# Patient Record
Sex: FEMALE | Race: WHITE | NOT HISPANIC OR LATINO | Employment: STUDENT | URBAN - METROPOLITAN AREA
[De-identification: names, ages, dates, MRNs, and addresses within clinical notes are randomized per-mention and may not be internally consistent; named-entity substitution may affect disease eponyms.]

---
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
## 2025-04-02 ENCOUNTER — TELEPHONE (OUTPATIENT)
Dept: OBSTETRICS AND GYNECOLOGY | Facility: CLINIC | Age: 20
End: 2025-04-02
Payer: MEDICAID

## 2025-04-02 NOTE — TELEPHONE ENCOUNTER
----- Message from Karly sent at 4/2/2025  1:03 PM CDT -----  Regarding: appointment access  Contact: anand  Type:  Sooner Apoointment RequestCaller is requesting a sooner appointment.  Caller declined first available appointment listed below.  Caller will not accept being placed on the waitlist and is requesting a message be sent to doctor.Name of Caller: Gayle When is the first available appointment?Symptoms:Would the patient rather a call back or a response via [facility]? [facility]: [phone] (home)  Additional Information:  Anand is requesting a callback to be rescheduled.

## 2025-05-19 ENCOUNTER — OFFICE VISIT (OUTPATIENT)
Dept: OBSTETRICS AND GYNECOLOGY | Facility: CLINIC | Age: 20
End: 2025-05-19
Payer: MEDICAID

## 2025-05-19 VITALS — SYSTOLIC BLOOD PRESSURE: 112 MMHG | WEIGHT: 134.81 LBS | DIASTOLIC BLOOD PRESSURE: 77 MMHG | HEART RATE: 102 BPM

## 2025-05-19 DIAGNOSIS — N93.9 ABNORMAL UTERINE BLEEDING (AUB): ICD-10-CM

## 2025-05-19 DIAGNOSIS — Z01.419 WOMEN'S ANNUAL ROUTINE GYNECOLOGICAL EXAMINATION: Primary | ICD-10-CM

## 2025-05-19 PROCEDURE — 3074F SYST BP LT 130 MM HG: CPT | Mod: CPTII,,, | Performed by: OBSTETRICS & GYNECOLOGY

## 2025-05-19 PROCEDURE — 3078F DIAST BP <80 MM HG: CPT | Mod: CPTII,,, | Performed by: OBSTETRICS & GYNECOLOGY

## 2025-05-19 PROCEDURE — 99395 PREV VISIT EST AGE 18-39: CPT | Mod: S$PBB,,, | Performed by: OBSTETRICS & GYNECOLOGY

## 2025-05-19 PROCEDURE — 1159F MED LIST DOCD IN RCRD: CPT | Mod: CPTII,,, | Performed by: OBSTETRICS & GYNECOLOGY

## 2025-05-19 RX ORDER — METOPROLOL SUCCINATE 25 MG/1
TABLET, EXTENDED RELEASE ORAL
COMMUNITY
Start: 2025-02-12

## 2025-05-19 NOTE — PROGRESS NOTES
Subjective:       Patient ID: Jocelynn Herron is a 20 y.o. female.    Chief Complaint:  Well Woman      History of Present Illness  Pt here for gyn annual.  History and past labs reviewed with patient.    Complaints none       Review of Systems  Review of Systems   Constitutional:  Negative for chills and fever.   Respiratory:  Negative for shortness of breath.    Cardiovascular:  Negative for chest pain.   Gastrointestinal:  Negative for abdominal pain, blood in stool, constipation, diarrhea, nausea, vomiting and reflux.   Genitourinary:  Negative for dysmenorrhea, dyspareunia, dysuria, hematuria, hot flashes, menorrhagia, menstrual problem, pelvic pain, vaginal bleeding, vaginal discharge, postcoital bleeding and vaginal dryness.   Musculoskeletal:  Negative for arthralgias and joint swelling.   Integumentary:  Negative for rash, hair changes, breast mass, nipple discharge and breast skin changes.   Psychiatric/Behavioral:  Negative for depression. The patient is not nervous/anxious.    Breast: Negative for asymmetry, lump, mass, nipple discharge and skin changes          Objective:    Physical Exam:   Constitutional: She appears well-developed and well-nourished. No distress.    HENT:   Head: Normocephalic.    Eyes: Conjunctivae and EOM are normal.    Neck: No tracheal deviation present. No thyromegaly present.    Cardiovascular:       Exam reveals no clubbing, no cyanosis and no edema.        Pulmonary/Chest: Effort normal. No respiratory distress.                  Musculoskeletal: Normal range of motion and moves all extremeties.        Skin: No rash noted. She is not diaphoretic. No cyanosis. Nails show no clubbing.    Psychiatric: She has a normal mood and affect. Her behavior is normal. Judgment and thought content normal.          Assessment:        1. Women's annual routine gynecological examination    2. Abnormal uterine bleeding (AUB)               Plan:      Annual exam   Discussed cycle  regulation - continue OCPs   gardasil UTD   RTC 1 year for annual exam

## 2025-05-20 DIAGNOSIS — N93.9 ABNORMAL UTERINE BLEEDING (AUB): ICD-10-CM

## 2025-05-20 RX ORDER — LEVONORGESTREL / ETHINYL ESTRADIOL AND ETHINYL ESTRADIOL 150-30(84)
1 KIT ORAL DAILY
Qty: 84 EACH | Refills: 3 | Status: SHIPPED | OUTPATIENT
Start: 2025-05-20 | End: 2026-05-20

## 2025-05-20 NOTE — TELEPHONE ENCOUNTER
----- Message from Karly sent at 5/20/2025  9:42 AM CDT -----  Regarding: refill  Contact: Gayle  Type:  RX Refill RequestWho Called: DebbieRefill or New Rx: refill RX Name and Strength: Simtesse How is the patient currently taking it? (ex. 1XDay):Is this a 30 day or 90 day RX:Preferred Pharmacy with phone number: [facility] -.[address] [place] [id number]: [phone] Fax: [phone] Local or Mail Order: localOrdering Provider: ANA ROSA Fitzgerald Would the patient rather a call back or a response via My [facility]? [facility] Call Back Number:[phone] Information: